# Patient Record
Sex: FEMALE | URBAN - METROPOLITAN AREA
[De-identification: names, ages, dates, MRNs, and addresses within clinical notes are randomized per-mention and may not be internally consistent; named-entity substitution may affect disease eponyms.]

---

## 2019-05-28 ENCOUNTER — OFFICE VISIT (OUTPATIENT)
Dept: URBAN - METROPOLITAN AREA CLINIC 11 | Facility: CLINIC | Age: 54
End: 2019-05-28
Payer: COMMERCIAL

## 2019-05-28 DIAGNOSIS — Z79.899 OTHER LONG TERM (CURRENT) DRUG THERAPY: ICD-10-CM

## 2019-05-28 DIAGNOSIS — H52.4 PRESBYOPIA: ICD-10-CM

## 2019-05-28 DIAGNOSIS — M06.09 RA W/O RHEUMATOID FACTOR OF MULTIPLE SITES: Primary | ICD-10-CM

## 2019-05-28 PROCEDURE — 92134 CPTRZ OPH DX IMG PST SGM RTA: CPT | Performed by: OPTOMETRIST

## 2019-05-28 PROCEDURE — 92004 COMPRE OPH EXAM NEW PT 1/>: CPT | Performed by: OPTOMETRIST

## 2019-05-28 ASSESSMENT — KERATOMETRY
OD: 44.00
OS: 43.38

## 2019-05-28 ASSESSMENT — INTRAOCULAR PRESSURE
OD: 16
OS: 16

## 2019-05-28 NOTE — IMPRESSION/PLAN
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 started Plaquenil in 2017 Plan: OCT of macula ordered and performed today ou. normal macula ou. ocular health normal ou. Okay to continue Plaquenil.